# Patient Record
Sex: FEMALE | Race: WHITE | NOT HISPANIC OR LATINO
[De-identification: names, ages, dates, MRNs, and addresses within clinical notes are randomized per-mention and may not be internally consistent; named-entity substitution may affect disease eponyms.]

---

## 2017-01-26 ENCOUNTER — RESULT REVIEW (OUTPATIENT)
Age: 52
End: 2017-01-26

## 2018-10-02 ENCOUNTER — TRANSCRIPTION ENCOUNTER (OUTPATIENT)
Age: 53
End: 2018-10-02

## 2021-06-21 ENCOUNTER — TRANSCRIPTION ENCOUNTER (OUTPATIENT)
Age: 56
End: 2021-06-21

## 2023-05-31 ENCOUNTER — APPOINTMENT (OUTPATIENT)
Dept: VASCULAR SURGERY | Facility: CLINIC | Age: 58
End: 2023-05-31
Payer: COMMERCIAL

## 2023-05-31 VITALS
HEIGHT: 66 IN | DIASTOLIC BLOOD PRESSURE: 84 MMHG | SYSTOLIC BLOOD PRESSURE: 135 MMHG | HEART RATE: 88 BPM | TEMPERATURE: 98.2 F | BODY MASS INDEX: 35.2 KG/M2 | WEIGHT: 219 LBS

## 2023-05-31 PROCEDURE — 99204 OFFICE O/P NEW MOD 45 MIN: CPT

## 2023-05-31 PROCEDURE — 93970 EXTREMITY STUDY: CPT

## 2023-05-31 NOTE — DATA REVIEWED
[FreeTextEntry1] : Lower extremity venous duplex done today demonstrates multiple varicose veins and insufficiency in L GSV. No evidence of DVT.

## 2023-05-31 NOTE — CONSULT LETTER
[Dear  ___] : Dear  [unfilled], [Consult Letter:] : I had the pleasure of evaluating your patient, [unfilled]. [Please see my note below.] : Please see my note below. [Consult Closing:] : Thank you very much for allowing me to participate in the care of this patient.  If you have any questions, please do not hesitate to contact me. [Sincerely,] : Sincerely, [FreeTextEntry3] : Tanya Cordova MD, FSVS, FACS\par Associate Chief, Vascular & Endovascular Surgery\par , Vascular Surgery Fellowship & Residency \par Associate Professor of Surgery,\par [facility] [employer] [facility]\par \par Division of Vascular & Endovascular Surgery\par [facility]\par [address]\[place] [address]\par Tel: [phone]\par \par

## 2023-05-31 NOTE — HISTORY OF PRESENT ILLNESS
[FreeTextEntry1] : 57F who presents for initial evaluation of bilateral leg pain for the past 24 months.\par Leg discomfort is associated with leg swelling, fatigue, heaviness, achiness, restlessness, muscle cramping, itchiness and dry skin.\par She complains of painful varicose veins.\par Her symptoms have persisted despite conservative management with leg elevation, exercise, attempted weight loss, over-the-counter medications (ibuprofen), and compression stocking use for more than 3 months.\par Her symptoms are aggravated by weight bearing, prolonged standing, prolonged sitting.\par Nothing helps to alleviate patient's symptoms.\par \par Her symptoms interfere with the her ADLs such as work, shopping and housekeeping.\par Her risks factor for venous disease are obesity, family history of venous disease, history of varicose veins.\par \par She stands for prolonged periods of time with the inability to take frequent breaks to elevate their legs.\par Previous treatment, vein procedures and vein surgeries include: wearing compression stockings for more than 3 months with little or no relief.\par \par PMH significant for HTN, \par PSH significant for \par NKDA \par

## 2023-05-31 NOTE — PHYSICAL EXAM
[2+] : left 2+ [Ankle Swelling (On Exam)] : present [Ankle Swelling Bilaterally] : bilaterally  [Varicose Veins Of Lower Extremities] : bilaterally [Ankle Swelling On The Right] : mild [No Rash or Lesion] : No rash or lesion [Alert] : alert [Oriented to Person] : oriented to person [Oriented to Place] : oriented to place [Oriented to Time] : oriented to time [Calm] : calm [] : not present [de-identified] : NAD [FreeTextEntry1] : Vein findings:\par Varicosities (spider, reticular, varicose) bilateral\par Edema bilateral\par Skin changes dry, flaky skin\par CEAP classification C3. \par

## 2023-11-02 RX ORDER — ALPRAZOLAM 0.5 MG/1
0.5 TABLET ORAL
Qty: 1 | Refills: 0 | Status: COMPLETED | COMMUNITY
Start: 2023-11-02 | End: 1900-01-01

## 2023-11-02 RX ORDER — LIDOCAINE HYDROCHLORIDE 10 MG/ML
1 INJECTION, SOLUTION INFILTRATION; PERINEURAL
Qty: 50 | Refills: 0 | Status: COMPLETED | COMMUNITY
Start: 2023-11-02 | End: 1900-01-01

## 2023-11-02 RX ORDER — SODIUM BICARBONATE 84 MG/ML
8.4 INJECTION, SOLUTION INTRAVENOUS
Qty: 5 | Refills: 0 | Status: COMPLETED | COMMUNITY
Start: 2023-11-02 | End: 1900-01-01

## 2023-11-06 ENCOUNTER — APPOINTMENT (OUTPATIENT)
Dept: VASCULAR SURGERY | Facility: CLINIC | Age: 58
End: 2023-11-06
Payer: COMMERCIAL

## 2023-11-06 PROCEDURE — 36475 ENDOVENOUS RF 1ST VEIN: CPT | Mod: LT

## 2023-11-06 RX ORDER — MULTIVIT-MIN/FERROUS FUMARATE 15 MG
TABLET ORAL
Refills: 0 | Status: ACTIVE | COMMUNITY
Start: 2023-11-06

## 2023-11-06 RX ORDER — SEMAGLUTIDE 1.34 MG/ML
4 INJECTION, SOLUTION SUBCUTANEOUS
Refills: 0 | Status: ACTIVE | COMMUNITY
Start: 2023-11-06

## 2023-11-06 RX ORDER — LOSARTAN POTASSIUM 25 MG/1
25 TABLET, FILM COATED ORAL
Refills: 0 | Status: ACTIVE | COMMUNITY
Start: 2023-11-06

## 2023-11-09 RX ORDER — ALPRAZOLAM 0.5 MG/1
0.5 TABLET ORAL
Qty: 1 | Refills: 0 | Status: COMPLETED | COMMUNITY
Start: 2023-11-09 | End: 1900-01-01

## 2023-11-13 ENCOUNTER — APPOINTMENT (OUTPATIENT)
Dept: VASCULAR SURGERY | Facility: CLINIC | Age: 58
End: 2023-11-13
Payer: COMMERCIAL

## 2023-11-13 PROCEDURE — 37766 PHLEB VEINS - EXTREM 20+: CPT | Mod: LT

## 2023-11-13 PROCEDURE — 93971 EXTREMITY STUDY: CPT | Mod: LT

## 2023-12-06 ENCOUNTER — APPOINTMENT (OUTPATIENT)
Dept: VASCULAR SURGERY | Facility: CLINIC | Age: 58
End: 2023-12-06
Payer: COMMERCIAL

## 2023-12-06 VITALS — SYSTOLIC BLOOD PRESSURE: 126 MMHG | DIASTOLIC BLOOD PRESSURE: 84 MMHG | HEART RATE: 80 BPM

## 2023-12-06 VITALS
SYSTOLIC BLOOD PRESSURE: 120 MMHG | DIASTOLIC BLOOD PRESSURE: 77 MMHG | HEIGHT: 66 IN | TEMPERATURE: 98.4 F | HEART RATE: 80 BPM

## 2023-12-06 PROCEDURE — 99213 OFFICE O/P EST LOW 20 MIN: CPT

## 2024-01-29 ENCOUNTER — APPOINTMENT (OUTPATIENT)
Dept: VASCULAR SURGERY | Facility: CLINIC | Age: 59
End: 2024-01-29
Payer: SELF-PAY

## 2024-01-29 PROCEDURE — 36468 NJX SCLRSNT SPIDER VEINS: CPT | Mod: 50

## 2024-01-29 NOTE — PROCEDURE
[FreeTextEntry1] : bilateral sclerotherapy  [FreeTextEntry3] : Procedural safety checklist and time out completed: Confirmed patient identification (Patient Name, , and/or medical record number including when possible affirmation by patient or parent/family/other). Confirmed procedure with the patient. Consent present, accurate and signed. Confirmed special equipment and supplies are present. Sterility confirmed. Position verified. Site/ side is marked and visible and confirmed. Procedure confirmed by consent. Accurate consent including side and site. Review of medical records, including venous ultrasound, noting correct procedure including site and side. MD/PA verifies presence and review of imaging studies and or written report of imaging studies. Agreement on the procedure to be performed. Time out completed. All of the above has been confirmed by the team. All patient-specific concerns have been addressed.   Indication:  bilateral lower extremity spider veins.   Procedure: bilateral lower extremity sclerotherapy.   Procedure Note: Ms. DAE APPIAH is a 58 year old F with bilateral lower extremity spider and branch veins.   I have discussed the risks of the procedure with the patient. Detailed discussion was held with the patient. Risks of itching, superficial thrombophlebitis, hyperpigmentation (darkening of the skin), allergic reactions, skin irritation due to extravasation of the sclerosant, air-embolism, and in rare cases deep vein thrombosis were all discussed with the patient.  Reviewed with patient that sclerotherapy is the injection of a sterile agent (polidocanol) into the spider and small branch varicose veins. It works by damaging the inner wall of the vein. Eventually, the vein collapses on itself and over time, the damaged vein is replaced with fibrous connective tissue and prevents blood flow through the vessel. Sclerotherapy does not prevent the development of new veins. Before the treatment, photos may be obtained. The patient agrees to the procedure. The patient was escorted into the procedure room, placed on the procedure table and a time out was called. The legs for sclerotherapy treatment were cleaned with 70% isopropyl alcohol.   Sclerosant used was 1.0 % polidocanol (Asclera). Each session consists of a total dose of 4 mL of 1.0 % Asclera (polidocanol) which is directly injected using a 30-gauge needle into the superficial spider veins and small branch veins.   1st session The following areas were injected bl thighs and calves 1.0 % Asclera  lot# [id number], expiration 2025   Dry gauze was applied to the treated areas of the leg and a compression bandage was applied. Patient instructed to wear the bandage for 72 hours and then wear 20-30mmHg compression stockings daily for minimum of 2 weeks. Patient may remove compression bandage after 24 hrs, shower and don compression stockings for continuous compression x 72 hrs. Patient tolerated the procedure well. A follow-up appt is scheduled for the patient and instructions provided.

## 2024-02-21 ENCOUNTER — APPOINTMENT (OUTPATIENT)
Dept: VASCULAR SURGERY | Facility: CLINIC | Age: 59
End: 2024-02-21
Payer: COMMERCIAL

## 2024-02-21 VITALS
BODY MASS INDEX: 34.07 KG/M2 | WEIGHT: 212 LBS | DIASTOLIC BLOOD PRESSURE: 87 MMHG | HEART RATE: 67 BPM | TEMPERATURE: 97.9 F | HEIGHT: 66 IN | SYSTOLIC BLOOD PRESSURE: 137 MMHG

## 2024-02-21 VITALS — HEART RATE: 74 BPM | DIASTOLIC BLOOD PRESSURE: 90 MMHG | SYSTOLIC BLOOD PRESSURE: 141 MMHG

## 2024-02-21 DIAGNOSIS — I83.893 VARICOSE VEINS OF BILATERAL LOWER EXTREMITIES WITH OTHER COMPLICATIONS: ICD-10-CM

## 2024-02-21 DIAGNOSIS — I87.2 VENOUS INSUFFICIENCY (CHRONIC) (PERIPHERAL): ICD-10-CM

## 2024-02-21 PROCEDURE — 99214 OFFICE O/P EST MOD 30 MIN: CPT

## 2024-02-22 PROBLEM — I83.893 VARICOSE VEINS OF BILATERAL LOWER EXTREMITIES WITH OTHER COMPLICATIONS: Status: ACTIVE | Noted: 2023-11-02

## 2024-02-22 PROBLEM — I87.2 VENOUS INSUFFICIENCY: Status: ACTIVE | Noted: 2023-12-07

## 2024-02-22 NOTE — PROCEDURE
[FreeTextEntry1] : RLE lateral spider vein trapped blood expressed by Nan GRAF pt tolerated it well 4x4 and tegaderm applied

## 2024-02-22 NOTE — PHYSICAL EXAM
[2+] : left 2+ [No Rash or Lesion] : No rash or lesion [Alert] : alert [Oriented to Person] : oriented to person [Oriented to Place] : oriented to place [Oriented to Time] : oriented to time [Calm] : calm [Varicose Veins Of Lower Extremities] : not present [Ankle Swelling (On Exam)] : not present [] : not present [de-identified] : NAD [de-identified] : NCAT [de-identified] : unlabored breathing [FreeTextEntry1] : bilateral lower extremities soft, warm and nontender LLE stab sites well healed mild staining RLE spider veins trapped blood right lateral spider vein no bleeding or drainage bilateral medial/lateral thigh spider veins no wounds or ulcers [de-identified] : SALEEML [de-identified] : nima cranial nerves 2-12 nima grossly intact [de-identified] : cooperative

## 2024-02-22 NOTE — HISTORY OF PRESENT ILLNESS
[FreeTextEntry1] : Pt presents for 1 month follow up of LLE. She is s/p left GSV RFA on 11/6/23 and LLE stab phlebectomy on 11/13/23. LLE stab sites well healed. Overall pt's left leg feels much better. She complains of mild soreness on her left medial thigh post GSV RFA. Otherwise, no other complaints. Does not elevate her legs or wears compression stockings. Denies claudication, rest pain, nonhealing wounds or ulcers. Patient states she has sclerotherapy scheduled in Jan 2024 [de-identified] : 2/21/2024 Pt presents for 1 month follow up after receiving bilateral sclerotherapy on 1/29/2024. History of venous insufficiency s/p left GSV RFA and left stab phleb. Stab sites well healed. Some staining and trapped blood on RLE lateral spider vein. Denies pain or itchiness. Denies leg swelling. Pt will be travelling to [place] for 1 week end of Feb/beginning of March.

## 2024-02-22 NOTE — ASSESSMENT
[Arterial/Venous Disease] : arterial/venous disease [FreeTextEntry1] : Impression - venous insufficiency  seen and examined with Nan GRAF  Plan Conservative medical management - leg elevation, knee high 20-30 mm hg compression stockings prn Pt is interested in another session of bilateral sclerotherapy Pt will call the office to schedule

## 2024-04-29 ENCOUNTER — TRANSCRIPTION ENCOUNTER (OUTPATIENT)
Age: 59
End: 2024-04-29

## 2024-04-29 ENCOUNTER — APPOINTMENT (OUTPATIENT)
Dept: VASCULAR SURGERY | Facility: CLINIC | Age: 59
End: 2024-04-29
Payer: SELF-PAY

## 2024-04-29 PROCEDURE — 36468 NJX SCLRSNT SPIDER VEINS: CPT

## 2024-04-29 NOTE — PROCEDURE
[FreeTextEntry1] : bilateral sclerotherapy  [FreeTextEntry3] : Procedural safety checklist and time out completed: Confirmed patient identification (Patient Name, , and/or medical record number including when possible affirmation by patient or parent/family/other). Confirmed procedure with the patient. Consent present, accurate and signed. Confirmed special equipment and supplies are present. Sterility confirmed. Position verified. Site/ side is marked and visible and confirmed. Procedure confirmed by consent. Accurate consent including side and site. Review of medical records, including venous ultrasound, noting correct procedure including site and side. MD/PA verifies presence and review of imaging studies and or written report of imaging studies. Agreement on the procedure to be performed. Time out completed. All of the above has been confirmed by the team. All patient-specific concerns have been addressed.   Indication:  bilateral lower extremity spider veins.   Procedure: bilateral lower extremity sclerotherapy.   Procedure Note: Ms. DAE APPIAH is a 58 year old F with bilateral lower extremity spider and branch veins.   I have discussed the risks of the procedure with the patient. Detailed discussion was held with the patient. Risks of itching, superficial thrombophlebitis, hyperpigmentation (darkening of the skin), allergic reactions, skin irritation due to extravasation of the sclerosant, air-embolism, and in rare cases deep vein thrombosis were all discussed with the patient.  Reviewed with patient that sclerotherapy is the injection of a sterile agent (polidocanol) into the spider and small branch varicose veins. It works by damaging the inner wall of the vein. Eventually, the vein collapses on itself and over time, the damaged vein is replaced with fibrous connective tissue and prevents blood flow through the vessel. Sclerotherapy does not prevent the development of new veins. Before the treatment, photos may be obtained. The patient agrees to the procedure. The patient was escorted into the procedure room, placed on the procedure table and a time out was called. The legs for sclerotherapy treatment were cleaned with 70% isopropyl alcohol.   Sclerosant used was 1.0 % polidocanol (Asclera). Each session consists of a total dose of 4 mL of 1.0 % Asclera (polidocanol) which is directly injected using a 30-gauge needle into the superficial spider veins and small branch veins.   The following areas were injected bilateral thighs and calves_ __1.0 % Asclera  lot# [id number] _, expiration _10/24   Dry gauze was applied to the treated areas of the leg and a compression bandage was applied. Patient instructed to wear the bandage for 72 hours and then wear 20-30mmHg compression stockings daily for minimum of 2 weeks. Patient may remove compression bandage after 24 hrs, shower and don compression stockings for continuous compression x 72 hrs. Patient tolerated the procedure well. A follow-up appt is scheduled for the patient and instructions provided.

## 2024-05-29 ENCOUNTER — APPOINTMENT (OUTPATIENT)
Dept: VASCULAR SURGERY | Facility: CLINIC | Age: 59
End: 2024-05-29
Payer: COMMERCIAL

## 2024-05-29 VITALS
DIASTOLIC BLOOD PRESSURE: 86 MMHG | SYSTOLIC BLOOD PRESSURE: 133 MMHG | BODY MASS INDEX: 34.39 KG/M2 | TEMPERATURE: 98.4 F | HEIGHT: 66 IN | HEART RATE: 73 BPM | WEIGHT: 214 LBS

## 2024-05-29 VITALS — DIASTOLIC BLOOD PRESSURE: 81 MMHG | HEART RATE: 71 BPM | SYSTOLIC BLOOD PRESSURE: 119 MMHG

## 2024-05-29 DIAGNOSIS — I78.1 NEVUS, NON-NEOPLASTIC: ICD-10-CM

## 2024-05-29 PROCEDURE — 99212 OFFICE O/P EST SF 10 MIN: CPT

## 2024-05-29 NOTE — HISTORY OF PRESENT ILLNESS
[FreeTextEntry1] : Pt presents for 1 month follow up of bilateral lower extremities. History of venous insufficiency s/p left GSV RFA 11/6/23, left stab phleb 11/13/23, bilateral sclerotherapy 1/2024. Most recent bilateral sclerotherapy on 4/29/24. Pt is happy with the results. She states the staining from previous sclero is slowly fading away. Denies any pain or new staining. No wounds or ulcers.

## 2024-05-29 NOTE — PHYSICAL EXAM
[2+] : left 2+ [Ankle Swelling (On Exam)] : not present [Varicose Veins Of Lower Extremities] : not present [] : not present [No Rash or Lesion] : No rash or lesion [Alert] : alert [Oriented to Person] : oriented to person [Oriented to Place] : oriented to place [Oriented to Time] : oriented to time [Calm] : calm [de-identified] : NAD [de-identified] : NCAT [de-identified] : unlabored breathing [FreeTextEntry1] : bilateral lower extremities soft, warm and nontender LLE stab sites well healed previous staining with improvement no trapped blood no wounds or ulcers [de-identified] : SALEEML [de-identified] : cooperative

## 2024-05-29 NOTE — ASSESSMENT
[FreeTextEntry1] : Impression - venous insufficiency and spider veins with improvement s/p bilateral sclero x 2   Plan Conservative medical management - leg elevation, knee high 20-30 mm hg compression stockings prn Return to office prn   [Arterial/Venous Disease] : arterial/venous disease